# Patient Record
Sex: MALE | Race: WHITE | Employment: OTHER | URBAN - METROPOLITAN AREA
[De-identification: names, ages, dates, MRNs, and addresses within clinical notes are randomized per-mention and may not be internally consistent; named-entity substitution may affect disease eponyms.]

---

## 2019-07-30 LAB
CALCULI COMPOSITION: NORMAL
MASS: 2 MG
STONE DESCRIPTION: NORMAL
STONE NUMBER: 1
STONE SIZE: NORMAL MM

## 2023-02-09 ENCOUNTER — OFFICE VISIT (OUTPATIENT)
Dept: PULMONOLOGY | Age: 73
End: 2023-02-09
Payer: MEDICARE

## 2023-02-09 VITALS
HEIGHT: 69 IN | OXYGEN SATURATION: 96 % | DIASTOLIC BLOOD PRESSURE: 62 MMHG | SYSTOLIC BLOOD PRESSURE: 114 MMHG | BODY MASS INDEX: 28.88 KG/M2 | WEIGHT: 195 LBS | HEART RATE: 99 BPM

## 2023-02-09 DIAGNOSIS — E66.3 OVERWEIGHT (BMI 25.0-29.9): ICD-10-CM

## 2023-02-09 DIAGNOSIS — G47.33 OSA (OBSTRUCTIVE SLEEP APNEA): Primary | ICD-10-CM

## 2023-02-09 DIAGNOSIS — I50.22 CHRONIC SYSTOLIC CONGESTIVE HEART FAILURE (HCC): ICD-10-CM

## 2023-02-09 DIAGNOSIS — R06.02 SHORTNESS OF BREATH: ICD-10-CM

## 2023-02-09 DIAGNOSIS — I10 PRIMARY HYPERTENSION: ICD-10-CM

## 2023-02-09 PROCEDURE — 1123F ACP DISCUSS/DSCN MKR DOCD: CPT | Performed by: INTERNAL MEDICINE

## 2023-02-09 PROCEDURE — 1036F TOBACCO NON-USER: CPT | Performed by: INTERNAL MEDICINE

## 2023-02-09 PROCEDURE — G8428 CUR MEDS NOT DOCUMENT: HCPCS | Performed by: INTERNAL MEDICINE

## 2023-02-09 PROCEDURE — G8484 FLU IMMUNIZE NO ADMIN: HCPCS | Performed by: INTERNAL MEDICINE

## 2023-02-09 PROCEDURE — G8419 CALC BMI OUT NRM PARAM NOF/U: HCPCS | Performed by: INTERNAL MEDICINE

## 2023-02-09 PROCEDURE — 3078F DIAST BP <80 MM HG: CPT | Performed by: INTERNAL MEDICINE

## 2023-02-09 PROCEDURE — 3074F SYST BP LT 130 MM HG: CPT | Performed by: INTERNAL MEDICINE

## 2023-02-09 PROCEDURE — 3017F COLORECTAL CA SCREEN DOC REV: CPT | Performed by: INTERNAL MEDICINE

## 2023-02-09 PROCEDURE — 99204 OFFICE O/P NEW MOD 45 MIN: CPT | Performed by: INTERNAL MEDICINE

## 2023-02-09 RX ORDER — METOPROLOL SUCCINATE 25 MG/1
12.5 TABLET, EXTENDED RELEASE ORAL DAILY
COMMUNITY

## 2023-02-09 RX ORDER — TAMSULOSIN HYDROCHLORIDE 0.4 MG/1
0.4 CAPSULE ORAL DAILY
COMMUNITY

## 2023-02-09 RX ORDER — TURMERIC 400 MG
CAPSULE ORAL
COMMUNITY

## 2023-02-09 RX ORDER — ASPIRIN 81 MG/1
81 TABLET ORAL DAILY
COMMUNITY

## 2023-02-09 RX ORDER — ATORVASTATIN CALCIUM 20 MG/1
20 TABLET, FILM COATED ORAL DAILY
COMMUNITY

## 2023-02-09 ASSESSMENT — SLEEP AND FATIGUE QUESTIONNAIRES
HOW LIKELY ARE YOU TO NOD OFF OR FALL ASLEEP WHILE SITTING QUIETLY AFTER LUNCH WITHOUT ALCOHOL: 2
HOW LIKELY ARE YOU TO NOD OFF OR FALL ASLEEP WHILE SITTING INACTIVE IN A PUBLIC PLACE: 0
HOW LIKELY ARE YOU TO NOD OFF OR FALL ASLEEP WHILE LYING DOWN TO REST IN THE AFTERNOON WHEN CIRCUMSTANCES PERMIT: 2
HOW LIKELY ARE YOU TO NOD OFF OR FALL ASLEEP WHEN YOU ARE A PASSENGER IN A CAR FOR AN HOUR WITHOUT A BREAK: 0
HOW LIKELY ARE YOU TO NOD OFF OR FALL ASLEEP WHILE WATCHING TV: 2
HOW LIKELY ARE YOU TO NOD OFF OR FALL ASLEEP WHILE SITTING AND READING: 1
HOW LIKELY ARE YOU TO NOD OFF OR FALL ASLEEP WHILE SITTING AND TALKING TO SOMEONE: 0
HOW LIKELY ARE YOU TO NOD OFF OR FALL ASLEEP IN A CAR, WHILE STOPPED FOR A FEW MINUTES IN TRAFFIC: 0
ESS TOTAL SCORE: 7

## 2023-02-09 NOTE — PROGRESS NOTES
PULMONARY OFFICE NEW PATIENT VISIT    CONSULTING PHYSICIAN:  Maria Del Rosario Meza MD , No ref. provider found     REASON FOR VISIT:   Chief Complaint   Patient presents with    New Patient     Ref: Dr. Mary Anne Orellana     Sleep Apnea       DATE OF VISIT: 2/9/2023    HISTORY OF PRESENT ILLNESS: 68y.o. year old male with a past medical history of nonischemic cardiomyopathy EF 45 to 50%, nonobstructive CAD (per cath 9/2020 30-40% LCx MLI RCA LAD), HTN, HLD, BPH comes into the pulmonary/sleep lab for the first time. Patient reports that he has been diagnosed to have obstructive sleep apnea in the past and was advised to use CPAP therapy. Patient has PTSD and severe claustrophobia which has prevented him from using the CPAP therapy. Patient says since the last sleep study he has lost significant amount of weight intentionally. He has been snoring much less and sleeping more consistently. Recently he underwent a loop recorder placement which showed that patient is having Wenckebach rhythm mostly at nighttime. His cardiologist has ordered him to seek reevaluation of his sleep apnea. He used to smoke cigarettes in the past but quit a long time ago. 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 Occasionally does have coughing spells which get better after he has been able to expectorate.      Sleep Medicine 2/9/2023   Sitting and reading 1   Watching TV 2   Sitting, inactive in a public place (e.g. a theatre or a meeting) 0   As a passenger in a car for an hour without a break 0   Lying down to rest in the afternoon when circumstances permit 2   Sitting and talking to someone 0   Sitting quietly after a lunch without alcohol 2   In a car, while stopped for a few minutes in traffic 0   [place] Sleepiness Score 7       STOP-BANG Questionnaire    Snore Loudly No   Often feel Tired/Fatigued/Sleepy Yes   Observed breathing pauses No   Blood pressure problems Yes   BMI >35 Kg/m2 Body mass index is 28.8 kg/m². Age>50 68 y.o. Neck Circumference>16 inches      Gender Male? male     Total 4       REVIEW OF SYSTEMS:   CONSTITUTIONAL SYMPTOMS: The patient denies fever, fatigue, night sweats, weight loss or weight gain. HEENT: No vision changes. No tinnitus, Denies sinus pain. No hoarseness, or dysphagia. NECK: Patient denies swelling in the neck. CARDIOVASCULAR: Denies chest pain, palpitation, syncope. RESPIRATORY: Denies shortness of breath or cough. GASTROINTESTINAL: Denies nausea, abdominal pain or change in bowel function. GENITOURINARY: Denies obstructive symptoms. No history of incontinence. BREASTS: No masses or lumps in the breasts. SKIN: No rashes or itching. MUSCULOSKELETAL: Denies weakness or bone pain. NEUROLOGICAL: No headaches or seizures. PSYCHIATRIC: Denies mood swings or depression. ENDOCRINE: Denies heat or cold intolerance or excessive thirst.  HEMATOLOGIC/LYMPHATIC: Denies easy bruising or lymph node swelling. ALLERGIC/IMMUNOLOGIC: No environmental allergies. PAST MEDICAL HISTORY:   Past Medical History:   Diagnosis Date    Hyperlipidemia     PVC (premature ventricular contraction)        PAST SURGICAL HISTORY:   Past Surgical History:   Procedure Laterality Date    DENTAL SURGERY      UMBILICAL HERNIA REPAIR         SOCIAL HISTORY:   Social History     Tobacco Use    Smoking status: Former     Types: Cigarettes    Smokeless tobacco: Former   Vaping Use    Vaping Use: Never used   Substance Use Topics    Drug use: Never       FAMILY HISTORY: No family history on file.     MEDICATIONS:     Current Outpatient Medications on File Prior to Visit   Medication Sig Dispense Refill    atorvastatin (LIPITOR) 20 MG tablet Take 20 mg by mouth daily      aspirin 81 MG EC tablet Take 81 mg by mouth daily      tamsulosin (FLOMAX) 0.4 MG capsule Take 0.4 mg by mouth daily      metoprolol succinate (TOPROL XL) 25 MG extended release tablet Take 12.5 mg by mouth daily      Prenatal Vit-Fe Fumarate-FA (M-VIT PO) Take by mouth      Turmeric 400 MG CAPS Take by mouth       No current facility-administered medications on file prior to visit. ALLERGIES:   Allergies as of 02/09/2023 - Fully Reviewed 02/09/2023   Allergen Reaction Noted    Penicillins Dizziness or Vertigo and Other (See Comments) 03/08/2013    Adhesive tape Rash 11/17/2020      OBJECTIVE:   height is 5' 9\" (1.753 m) and weight is 195 lb (88.5 kg). His blood pressure is 114/62 and his pulse is 99. His oxygen saturation is 96%. PHYSICAL EXAM:    CONSTITUTIONAL: He is a 68y.o.-year-old who appears his stated age. He is alert and oriented x 3 and in no acute distress. HEENT: PERRLA, EOMI. No scleral icterus. No thrush, atraumatic, normocephalic. Mallampati class 2 airway. NECK: Supple, without cervical or supraclavicular lymphadenopathy:  CARDIOVASCULAR: S1 S2 RRR. Without murmer  RESPIRATORY & CHEST: Lungs are clear to auscultation and percussion. No wheezing, no crackles. Good air movement  GASTROINTESTINAL & ABDOMEN: Soft, nontender, positive bowel sounds in all quadrants, non-distended, without hepatosplenomegaly. GENITOURINARY: Deferred. MUSCULOSKELETAL: No tenderness to palpation of the axial skeleton. There is no clubbing. No cyanosis. No edema of the lower extremities. SKIN OF BODY: No rash or jaundice. PSYCHIATRIC EVALUATION: Normal affect. Patient answers questions appropriately. HEMATOLOGIC/LYMPHATIC/ IMMUNOLOGIC: No palpable lymphadenopathy. NEUROLOGIC: Alert and oriented x 3. Groslly non-focal. Motor strength is 5+/5 in all muscle groups. The patient has a normal sensorium globally. LABS:    No flowsheet data found. IMAGING:    No new chest imaging for me to review.       Pulmonary Functions Testing Results:      Baseline polysomnography done on 6/19/2020  Overall AHI: 14.2  Lowest oxygen saturation: 88%  Time spent below an oxygen saturation 89%: 0.1-minute  Periodic limb movement index of 40.3        IMPRESSION AND RECOMMENDATIONS:     1. TAI (obstructive sleep apnea)  -Patient has been diagnosed to have mild obstructive sleep apnea in the past.  -Currently his sleep symptoms have decreased but nocturnal cardiac arrhythmias were seen. -In order to assess if he still having sleep apnea I will get a home sleep testing done. -If mild obstructive sleep apnea is again seen, patient will benefit from a referral to a dentist to consider mandibular advancement device. Patient is very claustrophobic and completely averse to using CPAP therapy. - Home Sleep Study; Future    2. Chronic systolic congestive heart failure (HCC)  -Patient's shortness of breath could be due to his chronic congestive heart failure. -Management as per cardiology. 3. Primary hypertension  -Untreated sleep apnea can lead to refractory hypertension. 4. Overweight (BMI 25.0-29.9)  -I discussed weight loss as a treatment strategy in achieving the healthcare goals. If patient loses even 10 to 15% of current body weight, it will be beneficial in improving the overall health. 5. Shortness of breath  -Unclear if patient has developed an obstructive airway disease. Patient did smoke in the past but has quit a long time ago.  -I will get a complete PFT done in order to assess his lung functions. -CT chest done in 2020 had shown bibasilar atelectasis but no lung masses or pulmonary nodules. - Full PFT Study With Bronchodilator; Future      Thank you  Dr. Mary Anne Orellana and Ms. Symone Chowdhury   for letting me take care of this very pleasant patient. Return in about 6 weeks (around 3/23/2023) for tai. Chiquis Mcneil MD  Pulmonary Critical Care and Sleep Medicine  2/9/2023, 12:33 PM    This note was completed using dragon medical speech recognition software. Grammatical errors, random word insertions, pronoun errors and incomplete sentences are occasional consequences of this technology due to software limitations. If there are questions or concerns about the content of this note of information contained within the body of this dictation they should be addressed with the provider for clarification.

## 2023-03-30 ENCOUNTER — TELEPHONE (OUTPATIENT)
Dept: PULMONOLOGY | Age: 73
End: 2023-03-30

## 2023-03-30 NOTE — TELEPHONE ENCOUNTER
LM on VM for pt to call office re: upcoming appt with Dr. Bernell Meckel. Patient needs to complete PFT and HST prior to appt. Patient will need to be rescheduled.

## 2023-10-07 ENCOUNTER — OFFICE VISIT (OUTPATIENT)
Age: 73
End: 2023-10-07

## 2023-10-07 VITALS
BODY MASS INDEX: 26.92 KG/M2 | SYSTOLIC BLOOD PRESSURE: 92 MMHG | HEART RATE: 86 BPM | OXYGEN SATURATION: 94 % | DIASTOLIC BLOOD PRESSURE: 60 MMHG | WEIGHT: 188 LBS | TEMPERATURE: 99.1 F | HEIGHT: 70 IN

## 2023-10-07 DIAGNOSIS — R50.9 FEVER, UNSPECIFIED FEVER CAUSE: Primary | ICD-10-CM

## 2023-10-07 LAB
BILIRUBIN, POC: NORMAL
BLOOD URINE, POC: NORMAL
CLARITY, POC: CLEAR
COLOR, POC: YELLOW
GLUCOSE URINE, POC: NORMAL
KETONES, POC: NORMAL
LEUKOCYTE EST, POC: NORMAL
Lab: NORMAL
NITRITE, POC: NORMAL
PH, POC: 7
PROTEIN, POC: NORMAL
QC PASS/FAIL: NORMAL
SARS-COV-2 RDRP RESP QL NAA+PROBE: NEGATIVE
SPECIFIC GRAVITY, POC: 1.01
UROBILINOGEN, POC: 0.2

## 2023-10-07 ASSESSMENT — ENCOUNTER SYMPTOMS
COUGH: 0
DIARRHEA: 0
SORE THROAT: 0
ABDOMINAL PAIN: 0
NAUSEA: 0
WHEEZING: 0
VOMITING: 0